# Patient Record
Sex: FEMALE | Race: WHITE | NOT HISPANIC OR LATINO | URBAN - METROPOLITAN AREA
[De-identification: names, ages, dates, MRNs, and addresses within clinical notes are randomized per-mention and may not be internally consistent; named-entity substitution may affect disease eponyms.]

---

## 2017-01-01 ENCOUNTER — COMMUNICATION - HEALTHEAST (OUTPATIENT)
Dept: INTERNAL MEDICINE | Facility: CLINIC | Age: 67
End: 2017-01-01

## 2017-01-01 ENCOUNTER — RECORDS - HEALTHEAST (OUTPATIENT)
Dept: ADMINISTRATIVE | Facility: OTHER | Age: 67
End: 2017-01-01

## 2017-01-01 ENCOUNTER — ANESTHESIA - HEALTHEAST (OUTPATIENT)
Dept: SURGERY | Facility: HOSPITAL | Age: 67
End: 2017-01-01

## 2017-01-01 ENCOUNTER — OFFICE VISIT - HEALTHEAST (OUTPATIENT)
Dept: INTERNAL MEDICINE | Facility: CLINIC | Age: 67
End: 2017-01-01

## 2017-01-01 ENCOUNTER — COMMUNICATION - HEALTHEAST (OUTPATIENT)
Dept: SCHEDULING | Facility: CLINIC | Age: 67
End: 2017-01-01

## 2017-01-01 DIAGNOSIS — R52 PAIN: ICD-10-CM

## 2017-01-01 DIAGNOSIS — F32.A DEPRESSION: ICD-10-CM

## 2017-01-01 DIAGNOSIS — Z12.11 COLON CANCER SCREENING: ICD-10-CM

## 2017-01-01 DIAGNOSIS — F41.9 ANXIETY: ICD-10-CM

## 2017-01-01 DIAGNOSIS — Z12.39 BREAST CANCER SCREENING: ICD-10-CM

## 2017-01-01 DIAGNOSIS — E87.6 HYPOKALEMIA: ICD-10-CM

## 2017-01-01 DIAGNOSIS — J45.909 ASTHMA: ICD-10-CM

## 2017-01-01 DIAGNOSIS — E11.9 DIABETES (H): ICD-10-CM

## 2017-01-01 DIAGNOSIS — I10 HTN (HYPERTENSION): ICD-10-CM

## 2017-01-01 DIAGNOSIS — Z23 NEED FOR VACCINATION: ICD-10-CM

## 2017-01-01 DIAGNOSIS — J44.9 COPD (CHRONIC OBSTRUCTIVE PULMONARY DISEASE) (H): ICD-10-CM

## 2017-01-01 DIAGNOSIS — K21.9 GERD (GASTROESOPHAGEAL REFLUX DISEASE): ICD-10-CM

## 2017-01-01 DIAGNOSIS — E78.5 HYPERLIPIDEMIA: ICD-10-CM

## 2017-01-01 DIAGNOSIS — Z12.31 OTHER SCREENING MAMMOGRAM: ICD-10-CM

## 2017-01-01 DIAGNOSIS — M19.90 DEGENERATIVE ARTHRITIS: ICD-10-CM

## 2017-01-01 DIAGNOSIS — D50.0 BLOOD LOSS ANEMIA: ICD-10-CM

## 2017-01-01 DIAGNOSIS — N28.9 DISORDER OF KIDNEY AND URETER: ICD-10-CM

## 2017-01-01 DIAGNOSIS — G47.00 INSOMNIA: ICD-10-CM

## 2017-01-01 DIAGNOSIS — G62.9 PERIPHERAL NEUROPATHY: ICD-10-CM

## 2017-01-01 DIAGNOSIS — R53.83 FATIGUE: ICD-10-CM

## 2017-01-01 ASSESSMENT — MIFFLIN-ST. JEOR
SCORE: 1656.21
SCORE: 1609.72
SCORE: 1646.01

## 2017-01-02 ENCOUNTER — COMMUNICATION - HEALTHEAST (OUTPATIENT)
Dept: INTERNAL MEDICINE | Facility: CLINIC | Age: 67
End: 2017-01-02

## 2017-01-02 DIAGNOSIS — F41.9 ANXIETY: ICD-10-CM

## 2017-01-02 DIAGNOSIS — R52 PAIN: ICD-10-CM

## 2017-01-05 ENCOUNTER — COMMUNICATION - HEALTHEAST (OUTPATIENT)
Dept: INTERNAL MEDICINE | Facility: CLINIC | Age: 67
End: 2017-01-05

## 2017-01-16 ENCOUNTER — COMMUNICATION - HEALTHEAST (OUTPATIENT)
Dept: INTERNAL MEDICINE | Facility: CLINIC | Age: 67
End: 2017-01-16

## 2017-01-16 ENCOUNTER — COMMUNICATION - HEALTHEAST (OUTPATIENT)
Dept: SCHEDULING | Facility: CLINIC | Age: 67
End: 2017-01-16

## 2017-01-16 DIAGNOSIS — M62.838 MUSCLE SPASM: ICD-10-CM

## 2017-01-16 DIAGNOSIS — E87.6 HYPOKALEMIA: ICD-10-CM

## 2017-02-06 ENCOUNTER — COMMUNICATION - HEALTHEAST (OUTPATIENT)
Dept: INTERNAL MEDICINE | Facility: CLINIC | Age: 67
End: 2017-02-06

## 2017-02-06 DIAGNOSIS — I10 HTN (HYPERTENSION): ICD-10-CM

## 2017-02-07 ENCOUNTER — RECORDS - HEALTHEAST (OUTPATIENT)
Dept: ADMINISTRATIVE | Facility: OTHER | Age: 67
End: 2017-02-07

## 2017-02-20 ENCOUNTER — COMMUNICATION - HEALTHEAST (OUTPATIENT)
Dept: INTERNAL MEDICINE | Facility: CLINIC | Age: 67
End: 2017-02-20

## 2017-02-20 DIAGNOSIS — R52 PAIN: ICD-10-CM

## 2017-02-20 DIAGNOSIS — M62.838 MUSCLE SPASM: ICD-10-CM

## 2017-03-05 ENCOUNTER — COMMUNICATION - HEALTHEAST (OUTPATIENT)
Dept: INTERNAL MEDICINE | Facility: CLINIC | Age: 67
End: 2017-03-05

## 2017-03-05 DIAGNOSIS — F41.9 ANXIETY: ICD-10-CM

## 2017-03-06 ENCOUNTER — OFFICE VISIT - HEALTHEAST (OUTPATIENT)
Dept: INTERNAL MEDICINE | Facility: CLINIC | Age: 67
End: 2017-03-06

## 2017-03-06 DIAGNOSIS — E66.9 OBESITY, UNSPECIFIED: ICD-10-CM

## 2017-03-06 DIAGNOSIS — M25.519 SHOULDER PAIN: ICD-10-CM

## 2017-03-06 DIAGNOSIS — N28.9 DISORDER OF KIDNEY AND URETER: ICD-10-CM

## 2017-03-06 DIAGNOSIS — J45.909 ASTHMA: ICD-10-CM

## 2017-03-06 DIAGNOSIS — E11.9 DIABETES (H): ICD-10-CM

## 2017-03-06 ASSESSMENT — MIFFLIN-ST. JEOR: SCORE: 1687.96

## 2017-03-07 ENCOUNTER — COMMUNICATION - HEALTHEAST (OUTPATIENT)
Dept: INTERNAL MEDICINE | Facility: CLINIC | Age: 67
End: 2017-03-07

## 2018-01-01 ENCOUNTER — COMMUNICATION - HEALTHEAST (OUTPATIENT)
Dept: INTERNAL MEDICINE | Facility: CLINIC | Age: 68
End: 2018-01-01

## 2018-01-01 ENCOUNTER — OFFICE VISIT - HEALTHEAST (OUTPATIENT)
Dept: INTERNAL MEDICINE | Facility: CLINIC | Age: 68
End: 2018-01-01

## 2018-01-01 ENCOUNTER — RECORDS - HEALTHEAST (OUTPATIENT)
Dept: ADMINISTRATIVE | Facility: OTHER | Age: 68
End: 2018-01-01

## 2018-01-01 DIAGNOSIS — E11.9 DIABETES (H): ICD-10-CM

## 2018-01-01 DIAGNOSIS — K21.9 GERD (GASTROESOPHAGEAL REFLUX DISEASE): ICD-10-CM

## 2018-01-01 DIAGNOSIS — R52 PAIN: ICD-10-CM

## 2018-01-01 DIAGNOSIS — E78.5 HYPERLIPIDEMIA: ICD-10-CM

## 2018-01-01 DIAGNOSIS — F41.9 ANXIETY: ICD-10-CM

## 2018-01-01 DIAGNOSIS — G47.00 INSOMNIA: ICD-10-CM

## 2018-01-01 DIAGNOSIS — J45.909 ASTHMA: ICD-10-CM

## 2018-01-01 DIAGNOSIS — E87.6 HYPOKALEMIA: ICD-10-CM

## 2018-01-01 DIAGNOSIS — I10 HTN (HYPERTENSION): ICD-10-CM

## 2018-01-01 DIAGNOSIS — J44.9 COPD (CHRONIC OBSTRUCTIVE PULMONARY DISEASE) (H): ICD-10-CM

## 2018-01-01 ASSESSMENT — MIFFLIN-ST. JEOR: SCORE: 1659.62

## 2018-03-15 ENCOUNTER — RECORDS - HEALTHEAST (OUTPATIENT)
Dept: ADMINISTRATIVE | Facility: OTHER | Age: 68
End: 2018-03-15

## 2018-03-19 ENCOUNTER — COMMUNICATION - HEALTHEAST (OUTPATIENT)
Dept: INTERNAL MEDICINE | Facility: CLINIC | Age: 68
End: 2018-03-19

## 2021-05-25 ENCOUNTER — RECORDS - HEALTHEAST (OUTPATIENT)
Dept: ADMINISTRATIVE | Facility: CLINIC | Age: 71
End: 2021-05-25

## 2021-05-26 ENCOUNTER — RECORDS - HEALTHEAST (OUTPATIENT)
Dept: ADMINISTRATIVE | Facility: CLINIC | Age: 71
End: 2021-05-26

## 2021-05-27 ENCOUNTER — RECORDS - HEALTHEAST (OUTPATIENT)
Dept: ADMINISTRATIVE | Facility: CLINIC | Age: 71
End: 2021-05-27

## 2021-05-28 ENCOUNTER — RECORDS - HEALTHEAST (OUTPATIENT)
Dept: ADMINISTRATIVE | Facility: CLINIC | Age: 71
End: 2021-05-28

## 2021-05-29 ENCOUNTER — RECORDS - HEALTHEAST (OUTPATIENT)
Dept: ADMINISTRATIVE | Facility: CLINIC | Age: 71
End: 2021-05-29

## 2021-05-30 ENCOUNTER — RECORDS - HEALTHEAST (OUTPATIENT)
Dept: ADMINISTRATIVE | Facility: CLINIC | Age: 71
End: 2021-05-30

## 2021-05-30 VITALS — HEIGHT: 62 IN | WEIGHT: 270 LBS | BODY MASS INDEX: 49.69 KG/M2

## 2021-05-31 VITALS
BODY MASS INDEX: 48.79 KG/M2 | WEIGHT: 254 LBS | HEIGHT: 61 IN | WEIGHT: 254 LBS | BODY MASS INDEX: 50.52 KG/M2 | BODY MASS INDEX: 50.52 KG/M2 | HEIGHT: 61 IN | BODY MASS INDEX: 48.79 KG/M2

## 2021-05-31 VITALS — BODY MASS INDEX: 48.4 KG/M2 | WEIGHT: 263 LBS | HEIGHT: 62 IN

## 2021-05-31 VITALS — HEIGHT: 61 IN | WEIGHT: 258 LBS | BODY MASS INDEX: 48.71 KG/M2

## 2021-05-31 VITALS — BODY MASS INDEX: 50.79 KG/M2 | HEIGHT: 61 IN | WEIGHT: 269 LBS

## 2021-05-31 VITALS — BODY MASS INDEX: 50.22 KG/M2 | WEIGHT: 266 LBS | HEIGHT: 61 IN

## 2021-06-01 ENCOUNTER — RECORDS - HEALTHEAST (OUTPATIENT)
Dept: ADMINISTRATIVE | Facility: CLINIC | Age: 71
End: 2021-06-01

## 2021-06-02 ENCOUNTER — RECORDS - HEALTHEAST (OUTPATIENT)
Dept: ADMINISTRATIVE | Facility: CLINIC | Age: 71
End: 2021-06-02

## 2021-06-09 NOTE — PROGRESS NOTES
[facility] Follow Up Note    Maria Eugenia Barnett   66 y.o. female    Date of Visit: 3/6/2017    Chief Complaint   Patient presents with     Follow-up     Pain     Subjective  This is a 66-year-old lady with multiple medical issues that include asthma, ABDs, chronic obesity, renal insufficiency and ongoing shoulder pain.  She does see the endocrinologist on a regular basis who manages her diabetes.  She tells me that her asthma has been fairly stable this winter with minimal episodes of shortness of breath or wheezing.  Her weight is about the same.  She did see the nephrologist approximately 1 month ago and I have had the opportunity to review their notes.  They seem to feel that she was stable.  They did check a sed rate and CRP which were both normal.  Her renal tests were about the same.  Their recommendation was to simply follow on a regular basis.  The shoulder and other joints have been problematic for her and she also saw her orthopedic doctor last month.  She seems to be a little bit confused as she said they were told he is leaving the system and going to [place] but I think they were not hearing correctly and that he is simply moving to the [place] office.  Her joints do seem to be getting worse and her current medications are not working as well as they used to.  She has no other new symptoms or problems at this time.    ROS A comprehensive review of systems was performed and was otherwise negative    Medications, allergies, and problem list were reviewed and updated    Exam  General Appearance:   On examination her blood pressure is 124/60.  Weight is 270 pounds and height is 62 inches.  BMI is 49.38.    Heart is in a sinus rhythm with a rate of 76 and no ectopy.    Lungs are clear.    No overt tenderness in the shoulders but she does have some limitation in the range of motion.    The patient is alert and oriented ×3.      Assessment/Plan  1. Asthma     2. Diabetes     3. Obesity     4.  Renal Insufficiency     5. Shoulder pain       She will continue to see the endocrinologist for diabetic management.    Her asthma appears to be under good control and so she will continue the same medication.    Obesity.  She will continue to try to diet but we have never been very successful.    Renal insufficiency.  Stable per review of the nephrologist.  She will follow-up with him as needed.    Ongoing joint pains.  We will watch for now but it's possible that some physical therapy might help with some of her peaks and pains.  In touch with her.    Total time of this office visit was 25 minutes with greater than 50% of the time spent in care coordination and patient counseling.  The following high BMI interventions were performed this visit: weight monitoring    Michael Barry MD      Current Outpatient Prescriptions on File Prior to Visit   Medication Sig     albuterol (ACCUNEB) 1.25 mg/3 mL nebulizer solution Take 1 ampule by nebulization 3 (three) times a day as needed for wheezing.     ALLERGY RELIEF, LORATADINE, 10 mg tablet TAKE ONE TABLET BY MOUTH ONCE DAILY     ALPRAZolam (XANAX) 1 MG tablet TAKE ONE TABLET BY MOUTH 4 TIMES DAILY AS NEEDED     ammonium lactate (LAC-HYDRIN) 5 % Lotn lotion Apply topically as needed.     blood sugar diagnostic (ONETOUCH ULTRA TEST) Strp USE TO TEST 5-7 TIMES DAILY     BLOOD-GLUCOSE METER (BLOOD GLUCOSE MONITOR KIT MISC) Use As Directed. OneTouch Basic System w/Device Kit     budesonide (PULMICORT) 0.5 mg/2 mL nebulizer solution Take 0.5 mg by nebulization 2 (two) times a day.     clotrimazole-betamethasone (LOTRISONE) cream Apply topically 2 (two) times a day.     cyclobenzaprine (FLEXERIL) 10 MG tablet TAKE ONE TABLET BY MOUTH THREE TIMES DAILY AS NEEDED FOR MUSCLE SPASM     DOCUSATE CALCIUM (STOOL SOFTENER ORAL) Take 1 tablet by mouth daily.      fenofibrate (TRIGLIDE) 160 MG tablet Take 1 tablet (160 mg total) by mouth daily.     ferrous sulfate 325 (65 FE) MG tablet  "Take 1 tablet by mouth daily with breakfast.     fluticasone-salmeterol (ADVAIR DISKUS) 500-50 mcg/dose DISKUS INHALE ONE DOSE BY MOUTH TWICE DAILY     furosemide (LASIX) 80 MG tablet TAKE ONE TABLET BY MOUTH TWICE DAILY     insulin aspart (NOVOLOG FLEXPEN) 100 unit/mL injection pen Inject under the skin.     insulin glargine (LANTUS) 100 unit/mL injection Inject 70 Units under the skin 2 (two) times a day.      insulin needles, disposable, (INSUPEN) 32 x 1/4 \" Ndle Use As Directed. NovoFine 32G X 6 MM Miscellaneous     KLOR-CON M20 20 mEq tablet TAKE ONE TABLET BY MOUTH ONCE DAILY     lancets Misc Use As Directed. OneTouch UltraSoft Lancets Miscellaneous     lisinopril (PRINIVIL,ZESTRIL) 5 MG tablet TAKE ONE TABLET BY MOUTH ONCE DAILY     MELATONIN ORAL Take 1 tablet by mouth daily.      miscellaneous medical supply Misc CPAP, heated humidifier, mask, headgear, filters and tubing. qhs. Pressure:Auto setting range 4 cm H2O to 20 cm H2O   With 3 lpm oxygen bled in at night/     montelukast (SINGULAIR) 10 mg tablet TAKE ONE TABLET BY MOUTH ONCE DAILY     nystatin (MYCOSTATIN) powder Apply to affected area 3 times daily     nystatin-triamcinolone (MYCOLOG II) cream Apply topically 2 (two) times a day.     omeprazole (PRILOSEC) 20 MG capsule TAKE ONE CAPSULE BY MOUTH ONCE DAILY     OXYGEN-AIR DELIVERY SYSTEMS MISC Discharge: 3 per nasal cannula. Frequency: day and with CPAP hs Duration of use: 99     sertraline (ZOLOFT) 100 MG tablet TAKE TWO TABLETS BY MOUTH ONCE DAILY     tiotropium bromide (SPIRIVA RESPIMAT) 1.25 mcg/actuation Mist Inhale.     traMADol (ULTRAM) 50 mg tablet TAKE ONE TABLET BY MOUTH THREE TIMES DAILY AS NEEDED     traMADol (ULTRAM) 50 mg tablet TAKE ONE TABLET BY MOUTH THREE TIMES DAILY AS NEEDED     traZODone (DESYREL) 100 MG tablet TAKE ONE TABLET BY MOUTH ONCE DAILY AT BEDTIME     VENTOLIN HFA 90 mcg/actuation inhaler INHALE TWO PUFFS INTO LUNGS EVERY 6 HOURS AS NEEDED FOR WHEEZING     warfarin " (COUMADIN) 5 MG tablet Take 5 mg by mouth.     [DISCONTINUED] ALPRAZolam (XANAX) 1 MG tablet TAKE ONE TABLET BY MOUTH 4 TIMES DAILY AS NEEDED     [DISCONTINUED] furosemide (LASIX) 80 MG tablet Take 40 mg by mouth 2 (two) times a day.     [DISCONTINUED] KLOR-CON M20 20 mEq tablet TAKE ONE TABLET BY MOUTH ONCE DAILY     [DISCONTINUED] traZODone (DESYREL) 100 MG tablet TAKE ONE TABLET BY MOUTH ONCE DAILY AT BEDTIME     No current facility-administered medications on file prior to visit.      Allergies   Allergen Reactions     Sulfa (Sulfonamide Antibiotics)      Social History   Substance Use Topics     Smoking status: Never Smoker     Smokeless tobacco: Never Used     Alcohol use None

## 2021-06-11 NOTE — PROGRESS NOTES
[facility] Follow Up Note    Maria Eugenia Barnett   67 y.o. female    Date of Visit: 6/7/2017    Chief Complaint   Patient presents with     Follow-up     3 month     Diabetes     Subjective  This is a 67-year-old lady with multiple medical problems that include type 2 diabetes, asthma and obesity.  She does see the endocrinologist on a regular basis for her diabetes.  She last saw him about 1 month ago and said that everything was stable.  Her blood sugars at this time are generally running under 130.  She has no other diabetic symptoms.  Her asthma is flared up somewhat this spring with the weather.  She has used her inhalers and nebulizers a little more than she had been but has not required any visits to the emergency room.  She continues to have difficulty losing weight but offers no other new complaints or no changes to her medical status since my last visit with her.  She is overdue for a mammogram as well as a colonoscopy.    Review of her family history reveals that her brother did have colon cancer.    ROS A comprehensive review of systems was performed and was otherwise negative    Medications, allergies, and problem list were reviewed and updated    Exam  General Appearance:   On examination her blood pressure is 128/60.  Weight is 263 pounds and height is 62 inches.  BMI is 48.10.    Lungs are clear today.    Heart is in a sinus rhythm with a rate of 82 and no ectopy.    No new peripheral edema.    The patient is alert and oriented ×3.      Assessment/Plan  1. Diabetes     2. Asthma     3. Colon cancer screening  Ambulatory referral for Colonoscopy   4. Breast cancer screening     5. Other screening mammogram  Mammo Screening Bilateral     Diabetes.  Based on her verbal report her diabetes is been stable.  She will continue to see her endocrinologist.  We will try to get her most recent numbers from his office.    Asthma.  Relatively stable.  Some flareup during the wet spring but  nothing of great significance.    She will continue to work on weight loss as best she can.    She is willing to do a screening mammogram which we will order.  We also discussed the need for colonoscopy given the family history and she is agreeable to this as well.  I will follow-up with her once these tests have been completed.  Otherwise see her back in 3 months.  Total time of this office visit was 25 minutes with greater than 50% of the time spent in care coordination and patient counseling.    The following high BMI interventions were performed this visit: weight monitoring    Michael Barry MD      Current Outpatient Prescriptions on File Prior to Visit   Medication Sig     albuterol (ACCUNEB) 1.25 mg/3 mL nebulizer solution Take 1 ampule by nebulization 3 (three) times a day as needed for wheezing.     ALLERGY RELIEF, LORATADINE, 10 mg tablet TAKE ONE TABLET BY MOUTH ONCE DAILY     ALPRAZolam (XANAX) 1 MG tablet TAKE ONE TABLET BY MOUTH 4 TIMES DAILY AS NEEDED     ammonium lactate (LAC-HYDRIN) 5 % Lotn lotion Apply topically as needed.     blood sugar diagnostic (ONETOUCH ULTRA TEST) Strp USE TO TEST 5-7 TIMES DAILY     BLOOD-GLUCOSE METER (BLOOD GLUCOSE MONITOR KIT MISC) Use As Directed. OneTouch Basic System w/Device Kit     budesonide (PULMICORT) 0.5 mg/2 mL nebulizer solution Take 0.5 mg by nebulization 2 (two) times a day.     clotrimazole-betamethasone (LOTRISONE) cream Apply topically 2 (two) times a day.     cyclobenzaprine (FLEXERIL) 10 MG tablet TAKE ONE TABLET BY MOUTH THREE TIMES DAILY AS NEEDED FOR MUSCLE SPASM     DOCUSATE CALCIUM (STOOL SOFTENER ORAL) Take 1 tablet by mouth daily.      fenofibrate (TRIGLIDE) 160 MG tablet Take 1 tablet (160 mg total) by mouth daily.     ferrous sulfate 325 (65 FE) MG tablet Take 1 tablet by mouth daily with breakfast.     fluticasone-salmeterol (ADVAIR DISKUS) 500-50 mcg/dose DISKUS INHALE ONE DOSE BY MOUTH TWICE DAILY     furosemide (LASIX) 80 MG tablet TAKE  "ONE TABLET BY MOUTH TWICE DAILY     insulin aspart (NOVOLOG FLEXPEN) 100 unit/mL injection pen Inject under the skin.     insulin glargine (LANTUS) 100 unit/mL injection Inject 70 Units under the skin 2 (two) times a day.      insulin needles, disposable, (INSUPEN) 32 x 1/4 \" Ndle Use As Directed. NovoFine 32G X 6 MM Miscellaneous     KLOR-CON M20 20 mEq tablet TAKE ONE TABLET BY MOUTH ONCE DAILY     lancets Misc Use As Directed. OneTouch UltraSoft Lancets Miscellaneous     lisinopril (PRINIVIL,ZESTRIL) 5 MG tablet TAKE ONE TABLET BY MOUTH ONCE DAILY     MELATONIN ORAL Take 1 tablet by mouth daily.      metoprolol tartrate (LOPRESSOR) 25 MG tablet      miscellaneous medical supply Misc CPAP, heated humidifier, mask, headgear, filters and tubing. qhs. Pressure:Auto setting range 4 cm H2O to 20 cm H2O   With 3 lpm oxygen bled in at night/     montelukast (SINGULAIR) 10 mg tablet TAKE ONE TABLET BY MOUTH ONCE DAILY     nystatin (MYCOSTATIN) powder Apply to affected area 3 times daily     nystatin-triamcinolone (MYCOLOG II) cream Apply topically 2 (two) times a day.     omeprazole (PRILOSEC) 20 MG capsule TAKE ONE CAPSULE BY MOUTH ONCE DAILY     OXYGEN-AIR DELIVERY SYSTEMS MIS Discharge: 3 per nasal cannula. Frequency: day and with CPAP hs Duration of use: 99     sertraline (ZOLOFT) 100 MG tablet TAKE TWO TABLETS BY MOUTH ONCE DAILY     tiotropium bromide (SPIRIVA RESPIMAT) 1.25 mcg/actuation Mist Inhale.     traMADol (ULTRAM) 50 mg tablet TAKE ONE TABLET BY MOUTH THREE TIMES DAILY AS NEEDED     traZODone (DESYREL) 100 MG tablet TAKE ONE TABLET BY MOUTH ONCE DAILY AT BEDTIME     VENTOLIN HFA 90 mcg/actuation inhaler INHALE TWO PUFFS BY MOUTH EVERY 6 HOURS AS NEEDED FOR WHEEZING     warfarin (COUMADIN) 5 MG tablet Take 5 mg by mouth.     [DISCONTINUED] traMADol (ULTRAM) 50 mg tablet TAKE ONE TABLET BY MOUTH THREE TIMES DAILY AS NEEDED     [DISCONTINUED] traZODone (DESYREL) 100 MG tablet TAKE ONE TABLET BY MOUTH ONCE " DAILY AT BEDTIME     [DISCONTINUED] VENTOLIN HFA 90 mcg/actuation inhaler INHALE TWO PUFFS INTO LUNGS EVERY 6 HOURS AS NEEDED FOR WHEEZING     No current facility-administered medications on file prior to visit.      Allergies   Allergen Reactions     Sulfa (Sulfonamide Antibiotics)      Social History   Substance Use Topics     Smoking status: Never Smoker     Smokeless tobacco: Never Used     Alcohol use None

## 2021-06-13 NOTE — PROGRESS NOTES
[facility] Follow Up Note    Maria Eugenia Barnett   67 y.o. female    Date of Visit: 10/5/2017    Chief Complaint   Patient presents with     Follow-up     not fasting/medication review     Subjective  This is a 67-year-old lady with known multiple medical issues that include diabetes, asthma, and chronic renal insufficiency.  She was hospitalized in August with an acute GI bleed.  The bleeding seemed to originate from the stomach and was felt to be secondary to an overly elevated INR.  She has been on Coumadin because of blood clots.  Since discharge she has done better.  She does complain of some fatigue although her breathing is been fairly stable.  No further evidence of GI bleed.  She was to have a follow-up EGD this last month but apparently there was some reluctance until she was seen by and cleared for the procedure by her cardiologist.  She did see him but is awaiting further recommendations.  Within the past week she has also seen her endocrinologist and her nephrologist.  Neither Dr. apparently made any changes in her current regimen.  Both of them have been following the appropriate blood work.  She seems a little unclear about proceeding from this point as she is a somewhat difficult historian.    ROS A comprehensive review of systems was performed and was otherwise negative    Medications, allergies, and problem list were reviewed and updated    Exam  General Appearance:   On examination her blood pressure is not obtainable because of the cough relationship to her arms.  Weight is 258 pounds and height is 60.5 inches.  BMI is 49.56.    Lungs are clear today.    Heart is in a sinus rhythm.  I hear no ectopy.    No new peripheral edema.    Abdomen is obese and difficult to examine but is soft and nontender.    The patient is alert and oriented ×3.      Assessment/Plan  1. Asthma     2. Renal Insufficiency     3. Diabetes     4. Fatigue     5. Blood loss anemia  Hemoglobin      Diabetes.  Continue follow-up with her endocrinologist.    Asthma.  Stable at this time.  No change in medication.    GI bleed secondary to over anticoagulation.  This seems to have resolved and she is on a new dose of Coumadin.  No evidence of bleeding since discharge.    Blood loss anemia.  Because she is fatigued we will check her hemoglobin today.    I will see her back in 1 month.  Total time of this office visit was 25 minutes with greater than 50% of the time spent in care coordination and patient counseling.  Body Mass Index was not assessed due to The patient was in with acute medical issues..    Michael Barry MD      Current Outpatient Prescriptions on File Prior to Visit   Medication Sig     albuterol (ACCUNEB) 1.25 mg/3 mL nebulizer solution Take 1 ampule by nebulization 3 (three) times a day as needed for wheezing.     albuterol (PROAIR HFA;PROVENTIL HFA;VENTOLIN HFA) 90 mcg/actuation inhaler Inhale 2 puffs every 6 (six) hours as needed for wheezing.     ALLERGY RELIEF, LORATADINE, 10 mg tablet TAKE ONE TABLET BY MOUTH ONCE DAILY     ALPRAZolam (XANAX) 1 MG tablet TAKE ONE TABLET BY MOUTH 4 TIMES DAILY AS NEEDED     ammonium lactate (LAC-HYDRIN) 5 % Lotn lotion Apply topically as needed.     atorvastatin (LIPITOR) 80 MG tablet TAKE ONE TABLET BY MOUTH ONCE DAILY     blood sugar diagnostic (ONETOUCH ULTRA TEST) Strp USE TO TEST 5-7 TIMES DAILY     BLOOD-GLUCOSE METER (BLOOD GLUCOSE MONITOR KIT MISC) Use As Directed. OneTouch Basic System w/Device Kit     budesonide (PULMICORT) 0.5 mg/2 mL nebulizer solution Take 0.5 mg by nebulization 2 (two) times a day.     clotrimazole-betamethasone (LOTRISONE) cream Apply topically 2 (two) times a day.     cyclobenzaprine (FLEXERIL) 10 MG tablet TAKE ONE TABLET BY MOUTH THREE TIMES DAILY AS NEEDED FOR MUSCLE SPASM     DOCUSATE CALCIUM (STOOL SOFTENER ORAL) Take 1 tablet by mouth daily.      fenofibrate (TRIGLIDE) 160 MG tablet Take 1 tablet (160 mg total) by  "mouth daily.     ferrous sulfate 325 (65 FE) MG tablet Take 1 tablet by mouth daily with breakfast.     fluticasone-salmeterol (ADVAIR DISKUS) 500-50 mcg/dose DISKUS INHALE ONE DOSE BY MOUTH TWICE DAILY     fluticasone-vilanterol (BREO ELLIPTA) 200-25 mcg/dose DsDv inhaler Inhale 1 puff daily.     furosemide (LASIX) 80 MG tablet TAKE ONE TABLET BY MOUTH TWICE DAILY     insulin aspart (NOVOLOG FLEXPEN) 100 unit/mL injection pen Inject under the skin.     insulin glargine (LANTUS) 100 unit/mL injection Inject 70 Units under the skin 2 (two) times a day.      insulin needles, disposable, (INSUPEN) 32 x 1/4 \" Ndle Use As Directed. NovoFine 32G X 6 MM Miscellaneous     KLOR-CON M20 20 mEq tablet TAKE ONE TABLET BY MOUTH ONCE DAILY     lancets Misc Use As Directed. OneTouch UltraSoft Lancets Miscellaneous     lisinopril (PRINIVIL,ZESTRIL) 5 MG tablet TAKE ONE TABLET BY MOUTH ONCE DAILY     lisinopril (PRINIVIL,ZESTRIL) 5 MG tablet TAKE ONE TABLET BY MOUTH ONCE DAILY     MELATONIN ORAL Take 1 tablet by mouth daily.      metoprolol tartrate (LOPRESSOR) 25 MG tablet      miscellaneous medical supply Misc CPAP, heated humidifier, mask, headgear, filters and tubing. qhs. Pressure:Auto setting range 4 cm H2O to 20 cm H2O   With 3 lpm oxygen bled in at night/     montelukast (SINGULAIR) 10 mg tablet TAKE ONE TABLET BY MOUTH ONCE DAILY     nystatin-triamcinolone (MYCOLOG II) cream Apply topically 2 (two) times a day.     omeprazole (PRILOSEC) 20 MG capsule TAKE ONE CAPSULE BY MOUTH ONCE DAILY     OXYGEN-AIR DELIVERY SYSTEMS MISC Discharge: 3 per nasal cannula. Frequency: day and with CPAP hs Duration of use: 99     sertraline (ZOLOFT) 100 MG tablet TAKE TWO TABLETS BY MOUTH ONCE DAILY     tiotropium bromide (SPIRIVA RESPIMAT) 1.25 mcg/actuation Mist Inhale.     traMADol (ULTRAM) 50 mg tablet TAKE ONE TABLET BY MOUTH THREE TIMES DAILY AS NEEDED     traZODone (DESYREL) 100 MG tablet TAKE ONE TABLET BY MOUTH ONCE DAILY AT BEDTIME "     VENTOLIN HFA 90 mcg/actuation inhaler INHALE TWO PUFFS BY MOUTH EVERY 6 HOURS AS NEEDED FOR WHEEZING     warfarin (COUMADIN) 5 MG tablet Take 5 mg by mouth.     No current facility-administered medications on file prior to visit.      Allergies   Allergen Reactions     Sulfa (Sulfonamide Antibiotics)      Mouth sores     Social History   Substance Use Topics     Smoking status: Never Smoker     Smokeless tobacco: Never Used     Alcohol use None

## 2021-06-14 NOTE — PROGRESS NOTES
[facility] Follow Up Note    Maria Eugenia Barnett   67 y.o. female    Date of Visit: 11/9/2017    Chief Complaint   Patient presents with     Follow-up     1 month     Subjective  This is a 67-year-old lady with multiple medical issues that include diabetes with peripheral neuropathy, degenerative arthritis with particular problems in the shoulders and asthma.  Since I last saw her she is actually been doing very well.  Late this summer she did have an episode of GI bleeding which is been reviewed in previous notes.  Since that discharge she has done well with no further bleeding and a hemoglobin that has been stable.  She tells me that her breathing is been fairly stable and she has not needed her inhalers anymore than usual this fall.  She does see the endocrinologist on a regular basis for her diabetes.  She saw him apparently about 1 month ago and everything was reported as stable.  Her sugars are generally below 150.  She will see him again in January.  Her biggest complaint revolves around her arthritic pains which are worst in the shoulders.  She has difficulty in raising her arms above the shoulder level because of this.  She has been seen by orthopedics in the past but is not a surgical candidate.  She is not thrilled about the idea of injections and so she is not return to her orthopedic doctor recently.  She does the best she can and is able to function pretty much at her baseline level.  She reports no other changes since my last visit with her and actually looks pretty good considering all of her medical issues.    ROS A comprehensive review of systems was performed and was otherwise negative    Medications, allergies, and problem list were reviewed and updated    Exam  General Appearance:   On examination today her blood pressure is 122/60.  Weight is 266 pounds and height is 60.5 inches.  BMI is 51.09.    Lungs are clear.    Heart is in a sinus rhythm with a rate of 98 and no  ectopy.    No new peripheral edema.    She has limited abduction of both arms at the shoulder level.    The patient is alert and oriented ×3.      Assessment/Plan  1. Asthma     2. Diabetes     3. Degenerative arthritis     4. Renal Insufficiency     5. Peripheral neuropathy       Asthma.  Stable.  No change in medication.    Diabetes.  She continues under the care of the endocrinologist.  Her neuropathy is slowly worsening but apparently her A1c's have been stable although I do not have the most recent number.  She follows up with endocrine in January.    Degenerative arthritis.  She is on pain medication that she takes as needed.  I do not have any additional recommendations.  Should the shoulders get significantly worse we could have her see her orthopedic doctors again.    I would like to see her back in January for follow-up.  Total time of this office visit was 25 minutes with greater than 50% of the time spent in care coordination and patient counseling.  The following high BMI interventions were performed this visit: weight monitoring    Michael Barry MD      Current Outpatient Prescriptions on File Prior to Visit   Medication Sig     albuterol (ACCUNEB) 1.25 mg/3 mL nebulizer solution Take 1 ampule by nebulization 3 (three) times a day as needed for wheezing.     albuterol (PROAIR HFA;PROVENTIL HFA;VENTOLIN HFA) 90 mcg/actuation inhaler Inhale 2 puffs every 6 (six) hours as needed for wheezing.     ALLERGY RELIEF, LORATADINE, 10 mg tablet TAKE ONE TABLET BY MOUTH ONCE DAILY     ALLERGY RELIEF, LORATADINE, 10 mg tablet TAKE ONE TABLET BY MOUTH ONCE DAILY     ALPRAZolam (XANAX) 1 MG tablet TAKE ONE TABLET BY MOUTH 4 TIMES DAILY AS NEEDED     ammonium lactate (LAC-HYDRIN) 5 % Lotn lotion Apply topically as needed.     atorvastatin (LIPITOR) 80 MG tablet TAKE ONE TABLET BY MOUTH ONCE DAILY     blood sugar diagnostic (ONETOUCH ULTRA TEST) Strp USE TO TEST 5-7 TIMES DAILY     BLOOD-GLUCOSE METER (BLOOD GLUCOSE  "MONITOR KIT MISC) Use As Directed. OneTouch Basic System w/Device Kit     budesonide (PULMICORT) 0.5 mg/2 mL nebulizer solution Take 0.5 mg by nebulization 2 (two) times a day.     clotrimazole-betamethasone (LOTRISONE) cream Apply topically 2 (two) times a day.     cyclobenzaprine (FLEXERIL) 10 MG tablet TAKE ONE TABLET BY MOUTH THREE TIMES DAILY AS NEEDED FOR MUSCLE SPASM     DOCUSATE CALCIUM (STOOL SOFTENER ORAL) Take 1 tablet by mouth daily.      fenofibrate (TRIGLIDE) 160 MG tablet Take 1 tablet (160 mg total) by mouth daily.     ferrous sulfate 325 (65 FE) MG tablet Take 1 tablet by mouth daily with breakfast.     fluticasone-salmeterol (ADVAIR DISKUS) 500-50 mcg/dose DISKUS INHALE ONE DOSE BY MOUTH TWICE DAILY     fluticasone-vilanterol (BREO ELLIPTA) 200-25 mcg/dose DsDv inhaler Inhale 1 puff daily.     furosemide (LASIX) 80 MG tablet TAKE ONE TABLET BY MOUTH TWICE DAILY     insulin aspart (NOVOLOG FLEXPEN) 100 unit/mL injection pen Inject under the skin.     insulin glargine (LANTUS) 100 unit/mL injection Inject 70 Units under the skin 2 (two) times a day.      insulin needles, disposable, (INSUPEN) 32 x 1/4 \" Ndle Use As Directed. NovoFine 32G X 6 MM Miscellaneous     KLOR-CON M20 20 mEq tablet TAKE ONE TABLET BY MOUTH ONCE DAILY     lancets Misc Use As Directed. OneTouch UltraSoft Lancets Miscellaneous     lisinopril (PRINIVIL,ZESTRIL) 5 MG tablet TAKE ONE TABLET BY MOUTH ONCE DAILY     MELATONIN ORAL Take 1 tablet by mouth daily.      metoprolol tartrate (LOPRESSOR) 25 MG tablet      miscellaneous medical supply Misc CPAP, heated humidifier, mask, headgear, filters and tubing. qhs. Pressure:Auto setting range 4 cm H2O to 20 cm H2O   With 3 lpm oxygen bled in at night/     montelukast (SINGULAIR) 10 mg tablet TAKE ONE TABLET BY MOUTH ONCE DAILY     nystatin-triamcinolone (MYCOLOG II) cream Apply topically 2 (two) times a day.     omeprazole (PRILOSEC) 20 MG capsule TAKE ONE CAPSULE BY MOUTH ONCE DAILY     " OXYGEN-AIR DELIVERY SYSTEMS MISC Discharge: 3 per nasal cannula. Frequency: day and with CPAP hs Duration of use: 99     sertraline (ZOLOFT) 100 MG tablet TAKE TWO TABLETS BY MOUTH ONCE DAILY     tiotropium bromide (SPIRIVA RESPIMAT) 1.25 mcg/actuation Mist Inhale.     traMADol (ULTRAM) 50 mg tablet TAKE ONE TABLET BY MOUTH THREE TIMES DAILY AS NEEDED     traZODone (DESYREL) 100 MG tablet TAKE ONE TABLET BY MOUTH ONCE DAILY AT BEDTIME     VENTOLIN HFA 90 mcg/actuation inhaler INHALE TWO PUFFS BY MOUTH EVERY 6 HOURS AS NEEDED FOR WHEEZING     warfarin (COUMADIN) 5 MG tablet Take 5 mg by mouth.     lisinopril (PRINIVIL,ZESTRIL) 5 MG tablet TAKE ONE TABLET BY MOUTH ONCE DAILY     No current facility-administered medications on file prior to visit.      Allergies   Allergen Reactions     Sulfa (Sulfonamide Antibiotics)      Mouth sores     Social History   Substance Use Topics     Smoking status: Never Smoker     Smokeless tobacco: Never Used     Alcohol use Not on file

## 2021-06-15 NOTE — PROGRESS NOTES
[facility] Follow Up Note    aMria Eugenia Barnett   67 y.o. female    Date of Visit: 1/11/2018    Chief Complaint   Patient presents with     Follow-up     Subjective  This is a 67-year-old lady with multiple medical issues that include asthma, COPD, diabetes and esophageal reflux disease.  She has been fairly stable.  She tells me that her sugars have generally been under good control although she did not bring a log book.  She does see the endocrinologist regularly for her diabetes.  She sees him next week.  Her A1c is apparently been stable so we will await her next visit with him.  Her pulmonary status has not been too bad.  She does tell me that she becomes somewhat short of breath in the cold weather which is not surprising but she uses oxygen at home and gets around reasonably well in the home itself.  In August she had an upper GI bleed felt to be related to gastritis and reflux disease.  She was to have had a follow-up EGD but it ended up being postponed until she could be evaluated preoperatively as they wanted to do it in the hospital.  For reasons of which I am unclear it was never done.  She does tell me she has had some vague epigastric discomfort over the past few weeks but thinks it may be due to diet.  No nausea or vomiting.  Otherwise there have been no changes in her system since the last visit here.    ROS A comprehensive review of systems was performed and was otherwise negative    Medications, allergies, and problem list were reviewed and updated    Exam  General Appearance:   On examination her blood pressure is 120/60.  Weight is 269 pounds and height is 60.5 inches.  BMI is 51.67.    Lungs are clear.    Heart is in a stable rhythm with a rate of 98 and no ectopy.    Abdomen is difficult to evaluate because of obesity.    Just a trace of ankle edema bilaterally.  This is stable.    The patient is alert and oriented ×3.      Assessment/Plan  1. Asthma     2. GERD  (gastroesophageal reflux disease)     3. Diabetes       Asthma.  Stable.  Continue current medication.  They have their  looking into the possibility of portable oxygen.  They are also pursuing the possibility of a lift chair which would be useful because of her lung disease and her obesity.  Would allow easier transfer at home.    Diabetes.  Continue to follow-up with the endocrinologist next week as scheduled.    Epigastric discomfort.  She will try modifying her diet and see if this helps.  If not we may need to try to reschedule the EGD.    Total time of this office visit was 25 minutes with greater than 50% of the time spent in care coordination and patient counseling.  The following high BMI interventions were performed this visit: weight monitoring    Michael Barry MD      Current Outpatient Prescriptions on File Prior to Visit   Medication Sig     albuterol (ACCUNEB) 1.25 mg/3 mL nebulizer solution Take 1 ampule by nebulization 3 (three) times a day as needed for wheezing.     albuterol (PROAIR HFA;PROVENTIL HFA;VENTOLIN HFA) 90 mcg/actuation inhaler Inhale 2 puffs every 6 (six) hours as needed for wheezing.     ALLERGY RELIEF, LORATADINE, 10 mg tablet TAKE ONE TABLET BY MOUTH ONCE DAILY     ALLERGY RELIEF, LORATADINE, 10 mg tablet TAKE ONE TABLET BY MOUTH ONCE DAILY     ALPRAZolam (XANAX) 1 MG tablet TAKE ONE TABLET BY MOUTH 4 TIMES DAILY AS NEEDED     ALPRAZolam (XANAX) 1 MG tablet TAKE ONE TABLET BY MOUTH 4 TIMES DAILY AS NEEDED     ammonium lactate (LAC-HYDRIN) 5 % Lotn lotion Apply topically as needed.     atorvastatin (LIPITOR) 80 MG tablet TAKE ONE TABLET BY MOUTH ONCE DAILY     blood sugar diagnostic (ONETOUCH ULTRA TEST) Strp USE TO TEST 5-7 TIMES DAILY     BLOOD-GLUCOSE METER (BLOOD GLUCOSE MONITOR KIT MISC) Use As Directed. OneTouch Basic System w/Device Kit     budesonide (PULMICORT) 0.5 mg/2 mL nebulizer solution Take 0.5 mg by nebulization 2 (two) times a day.      "clotrimazole-betamethasone (LOTRISONE) cream Apply topically 2 (two) times a day.     cyclobenzaprine (FLEXERIL) 10 MG tablet TAKE ONE TABLET BY MOUTH THREE TIMES DAILY AS NEEDED FOR MUSCLE SPASM     DOCUSATE CALCIUM (STOOL SOFTENER ORAL) Take 1 tablet by mouth daily.      fenofibrate (TRIGLIDE) 160 MG tablet Take 1 tablet (160 mg total) by mouth daily.     ferrous sulfate 325 (65 FE) MG tablet Take 1 tablet by mouth daily with breakfast.     fluticasone-salmeterol (ADVAIR DISKUS) 500-50 mcg/dose DISKUS INHALE ONE DOSE BY MOUTH TWICE DAILY     fluticasone-vilanterol (BREO ELLIPTA) 200-25 mcg/dose DsDv inhaler Inhale 1 puff daily.     furosemide (LASIX) 80 MG tablet TAKE ONE TABLET BY MOUTH TWICE DAILY     insulin aspart (NOVOLOG FLEXPEN) 100 unit/mL injection pen Inject under the skin.     insulin glargine (LANTUS) 100 unit/mL injection Inject 70 Units under the skin 2 (two) times a day.      insulin needles, disposable, (INSUPEN) 32 x 1/4 \" Ndle Use As Directed. NovoFine 32G X 6 MM Miscellaneous     KLOR-CON M20 20 mEq tablet TAKE ONE TABLET BY MOUTH ONCE DAILY     lancets Misc Use As Directed. OneTouch UltraSoft Lancets Miscellaneous     lisinopril (PRINIVIL,ZESTRIL) 5 MG tablet TAKE ONE TABLET BY MOUTH ONCE DAILY     lisinopril (PRINIVIL,ZESTRIL) 5 MG tablet TAKE ONE TABLET BY MOUTH ONCE DAILY     MELATONIN ORAL Take 1 tablet by mouth daily.      metoprolol tartrate (LOPRESSOR) 25 MG tablet      miscellaneous medical supply Misc CPAP, heated humidifier, mask, headgear, filters and tubing. qhs. Pressure:Auto setting range 4 cm H2O to 20 cm H2O   With 3 lpm oxygen bled in at night/     montelukast (SINGULAIR) 10 mg tablet TAKE ONE TABLET BY MOUTH ONCE DAILY     nystatin-triamcinolone (MYCOLOG II) cream Apply topically 2 (two) times a day.     omeprazole (PRILOSEC) 20 MG capsule TAKE ONE CAPSULE BY MOUTH ONCE DAILY     OXYGEN-AIR DELIVERY SYSTEMS MISC Discharge: 3 per nasal cannula. Frequency: day and with CPAP hs " Duration of use: 99     sertraline (ZOLOFT) 100 MG tablet TAKE TWO TABLETS BY MOUTH ONCE DAILY     tiotropium bromide (SPIRIVA RESPIMAT) 1.25 mcg/actuation Mist Inhale.     traMADol (ULTRAM) 50 mg tablet TAKE ONE TABLET BY MOUTH THREE TIMES DAILY AS NEEDED     traZODone (DESYREL) 100 MG tablet TAKE ONE TABLET BY MOUTH ONCE DAILY AT BEDTIME     traZODone (DESYREL) 100 MG tablet TAKE ONE TABLET BY MOUTH ONCE DAILY AT BEDTIME     VENTOLIN HFA 90 mcg/actuation inhaler INHALE TWO PUFFS BY MOUTH EVERY 6 HOURS AS NEEDED FOR WHEEZING     warfarin (COUMADIN) 5 MG tablet Take 5 mg by mouth.     No current facility-administered medications on file prior to visit.      Allergies   Allergen Reactions     Sulfa (Sulfonamide Antibiotics)      Mouth sores     Social History   Substance Use Topics     Smoking status: Never Smoker     Smokeless tobacco: Never Used     Alcohol use None

## 2021-07-03 NOTE — ADDENDUM NOTE
Addendum Note by Tj Quezada CMA at 10/5/2017  1:39 PM     Author: Tj Quezada CMA Service: -- Author Type: Certified Medical Assistant    Filed: 10/5/2017  1:39 PM Encounter Date: 10/5/2017 Status: Signed    : Tj Quezada CMA (Certified Medical Assistant)    Addended by: TJ QUEZADA on: 10/5/2017 01:39 PM        Modules accepted: Orders